# Patient Record
Sex: FEMALE | Race: OTHER | HISPANIC OR LATINO | URBAN - METROPOLITAN AREA
[De-identification: names, ages, dates, MRNs, and addresses within clinical notes are randomized per-mention and may not be internally consistent; named-entity substitution may affect disease eponyms.]

---

## 2020-01-30 ENCOUNTER — EMERGENCY (EMERGENCY)
Facility: HOSPITAL | Age: 25
LOS: 1 days | Discharge: HOME | End: 2020-01-30
Attending: EMERGENCY MEDICINE
Payer: MEDICAID

## 2020-01-30 VITALS
SYSTOLIC BLOOD PRESSURE: 116 MMHG | RESPIRATION RATE: 20 BRPM | TEMPERATURE: 98 F | DIASTOLIC BLOOD PRESSURE: 83 MMHG | HEART RATE: 78 BPM | WEIGHT: 134.92 LBS | HEIGHT: 64 IN | OXYGEN SATURATION: 99 %

## 2020-01-30 DIAGNOSIS — N75.0 CYST OF BARTHOLIN'S GLAND: ICD-10-CM

## 2020-01-30 PROBLEM — Z00.00 ENCOUNTER FOR PREVENTIVE HEALTH EXAMINATION: Status: ACTIVE | Noted: 2020-01-30

## 2020-01-30 LAB
APPEARANCE UR: CLEAR — SIGNIFICANT CHANGE UP
BILIRUB UR-MCNC: NEGATIVE — SIGNIFICANT CHANGE UP
COLOR SPEC: YELLOW — SIGNIFICANT CHANGE UP
DIFF PNL FLD: ABNORMAL
GLUCOSE UR QL: NEGATIVE — SIGNIFICANT CHANGE UP
HCG UR QL: NEGATIVE — SIGNIFICANT CHANGE UP
KETONES UR-MCNC: ABNORMAL
LEUKOCYTE ESTERASE UR-ACNC: ABNORMAL
NITRITE UR-MCNC: NEGATIVE — SIGNIFICANT CHANGE UP
PH UR: 6 — SIGNIFICANT CHANGE UP (ref 5–8)
PROT UR-MCNC: 30 MG/DL
SP GR SPEC: 1.02 — SIGNIFICANT CHANGE UP (ref 1.01–1.02)
UROBILINOGEN FLD QL: 4

## 2020-01-30 PROCEDURE — 99284 EMERGENCY DEPT VISIT MOD MDM: CPT | Mod: 25

## 2020-01-30 PROCEDURE — 81025 URINE PREGNANCY TEST: CPT

## 2020-01-30 PROCEDURE — 81001 URINALYSIS AUTO W/SCOPE: CPT

## 2020-01-30 PROCEDURE — 99283 EMERGENCY DEPT VISIT LOW MDM: CPT

## 2020-01-30 PROCEDURE — 87075 CULTR BACTERIA EXCEPT BLOOD: CPT

## 2020-01-30 PROCEDURE — 87205 SMEAR GRAM STAIN: CPT

## 2020-01-30 PROCEDURE — 87086 URINE CULTURE/COLONY COUNT: CPT

## 2020-01-30 PROCEDURE — 56420 I&D BARTHOLINS GLAND ABSCESS: CPT | Mod: RT

## 2020-01-30 PROCEDURE — 56420 I&D BARTHOLINS GLAND ABSCESS: CPT

## 2020-01-30 PROCEDURE — 87070 CULTURE OTHR SPECIMN AEROBIC: CPT

## 2020-01-30 RX ORDER — LIDOCAINE HYDROCHLORIDE AND EPINEPHRINE 10; 10 MG/ML; UG/ML
10 INJECTION, SOLUTION INFILTRATION; PERINEURAL ONCE
Refills: 0 | Status: COMPLETED | OUTPATIENT
Start: 2020-01-30 | End: 2020-01-30

## 2020-01-30 RX ORDER — ACETAMINOPHEN 500 MG
650 TABLET ORAL ONCE
Refills: 0 | Status: COMPLETED | OUTPATIENT
Start: 2020-01-30 | End: 2020-01-30

## 2020-01-30 RX ADMIN — Medication 650 MILLIGRAM(S): at 10:00

## 2020-01-30 RX ADMIN — Medication 650 MILLIGRAM(S): at 09:06

## 2020-01-30 RX ADMIN — LIDOCAINE HYDROCHLORIDE AND EPINEPHRINE 10 MILLILITER(S): 10; 10 INJECTION, SOLUTION INFILTRATION; PERINEURAL at 11:20

## 2020-01-30 NOTE — CONSULT NOTE ADULT - ASSESSMENT
a/p Right barthmitzys giorgio henry at bedside; informed consent for I&D taken  pts partner at bedside. Area cleaned with betadine, lidocaine with epi 10cc used for pain. Then incision made. +lots of pus  Qtip culture used to get all corners of area. Area cleaned with saline. Word catheter placed, 3cc used.  pt instructed to go home with doxycyline for 10 days  no sex for one month and follow up with gyn 2 weeks   spoke pt in [place].   jorge

## 2020-01-30 NOTE — ED PROVIDER NOTE - NSFOLLOWUPINSTRUCTIONS_ED_ALL_ED_FT
Please follow up with OB/GYN in 10 days.  Please use ibuprofen as needed for pain.  Please take the antibiotics as prescribed.  Please return to the emergency department if you have severe worsening pain, fever, discharge, or any other symptoms.

## 2020-01-30 NOTE — ED PROVIDER NOTE - OBJECTIVE STATEMENT
24yoF previously healthy on no meds presents with painful vaginal cyst x 1 wk, swelling improved with Augmentin x2 days prescribed by PMD but pain worsening. Denies fever, vaginal discharge, abdominal pain, dysuria, or any other symptoms.

## 2020-01-30 NOTE — CONSULT NOTE ADULT - PROBLEM SELECTOR RECOMMENDATION 9
a/p Right barthmitzys giogrio henry at bedside; informed consent for I&D taken  pts partner at bedside. Area cleaned with betadine, lidocaine with epi 10cc used for pain. Then incision made. +lots of pus  Qtip culture used to get all corners of area. Area cleaned with saline. Word catheter placed, 3cc used.  pt instructed to go home with doxycyline for 10 days  no sex for one month and follow up with gyn 2 weeks   spoke pt in [place].   jorge

## 2020-01-30 NOTE — ED PROVIDER NOTE - NSFOLLOWUPCLINICS_GEN_ALL_ED_FT
[facility]  [address]  [address] [place] [address]  Phone: [phone]  Fax: [phone]  Follow Up Time: 1-3 Days

## 2020-01-30 NOTE — CONSULT NOTE ADULT - SUBJECTIVE AND OBJECTIVE BOX
24y.o g0 came c/o Right labial pain for one week, pt was taking augmentin for 2 days. denies fever and chills  ob/gyn never seen   pmhx: denies  pshx: denies  all:nka  meds:denies   sochx: no etoh/drug/tobacco use    REVIEW OF SYSTEMS: see HPI	    PE:  Vital Signs Last 24 Hrs  T(C): 36.9 (2020 08:11), Max: 36.9 (2020 08:11)  T(F): 98.5 (2020 08:11), Max: 98.5 (2020 08:11)  HR: 78 (2020 08:11) (78 - 78)  BP: 116/83 (2020 08:11) (116/83 - 116/83)  BP(mean): --  RR: 20 (2020 08:11) (20 - 20)  SpO2: 99% (2020 08:11) (99% - 99%)  abd: +bs; soft, nt, nd, no rebound or guarding; no cvat b/l  pelvis:  +right barthlins cyst +fluctuating.3cm x4cm.             Urinalysis Basic - ( 2020 08:54 )    Color: Yellow / Appearance: Clear / S.025 / pH: x  Gluc: x / Ketone: Large  / Bili: Negative / Urobili: 4   Blood: x / Protein: 30 mg/dL / Nitrite: Negative   Leuk Esterase: Trace / RBC: 0-2 /HPF / WBC 0-2 /HPF   Sq Epi: x / Non Sq Epi: Few /HPF / Bacteria: x        a/p Right bartholins abcess   at bedside; informed consent for I&D taken  pts partner at bedside. Area cleaned with betadine, lidocaine with epi 10cc used for pain. Then incision made. +lots of pus  Qtip culture used to get all corners of area. Area cleaned with saline. Word catheter placed, 3cc used.  pt instructed to go home with doxycyline for 10 days  no sex for one month and follow up with gyn 2 weeks   spoke pt in [place]gasque.   jorge

## 2020-01-30 NOTE — ED ADULT NURSE NOTE - OBJECTIVE STATEMENT
Pt AOx4, ambulatory, c/o vaginal labia cyst x 10 days with worsening pain and swelling x 2 days. Pt denies vaginal bleed, discharge or dysuria. Currently on antbx.

## 2020-01-30 NOTE — ED PROVIDER NOTE - CLINICAL SUMMARY MEDICAL DECISION MAKING FREE TEXT BOX
Localized Bartholin cyst with no e/o spread or cellulitis. No s/s's to suggest PID or UTI. Localized Bartholin cyst with no e/o spread or cellulitis. No s/s's to suggest PID or UTI. Seen by OB/GYN, SISI Zuniga and attending and I&D performed. Patient is well appearing, NAD, afebrile, hemodynamically stable. Discharged with rx doxycycline per GN request, instructions in further symptomatic care, return precautions, and need for GYN f/u.

## 2020-01-30 NOTE — ED PROVIDER NOTE - PHYSICAL EXAMINATION
Afebrile, hemodynamically stable, saturating well  NAD, well appearing  Head NCAT  EOMI grossly, anicteric  MMM  Abd soft, NT, ND, no rebound or guarding   (RN Aleksandra as chaperone): enlarged tender Bartholin cyst, no surrounding erythema  AAO, CN's 3-12 grossly intact  ALVARES spontaneously, no leg cyanosis or edema  Skin warm, well perfused, no rashes or hives

## 2020-01-30 NOTE — ED PROVIDER NOTE - PATIENT PORTAL LINK FT
You can access the FollowMyHealth Patient Portal offered by [facility] by registering at the following website: [email or web address]. By joining [facility]’s FollowMyHealth portal, you will also be able to view your health information using other applications (apps) compatible with our system.

## 2020-01-30 NOTE — ED ADULT NURSE NOTE - NSIMPLEMENTINTERV_GEN_ALL_ED
Implemented All Universal Safety Interventions:  [place] to call system. Call bell, personal items and telephone within reach. Instruct patient to call for assistance. Room bathroom lighting operational. Non-slip footwear when patient is off stretcher. Physically safe environment: no spills, clutter or unnecessary equipment. Stretcher in lowest position, wheels locked, appropriate side rails in place.

## 2020-01-31 LAB
CULTURE RESULTS: NO GROWTH — SIGNIFICANT CHANGE UP
SPECIMEN SOURCE: SIGNIFICANT CHANGE UP

## 2020-02-04 ENCOUNTER — APPOINTMENT (OUTPATIENT)
Dept: OBGYN | Facility: CLINIC | Age: 25
End: 2020-02-04

## 2021-05-03 NOTE — CONSULT NOTE ADULT - PROVIDER SPECIALTY LIST ADULT
GYN
Detail Level: Generalized
General Sunscreen Counseling: I recommended a broad spectrum sunscreen with a SPF of 30 or higher.  I explained that SPF 30 sunscreens block approximately 97 percent of the sun's harmful rays.  Sunscreens should be applied at least 15 minutes prior to expected sun exposure and then every 2 hours after that as long as sun exposure continues. If swimming or exercising sunscreen should be reapplied every 45 minutes to an hour after getting wet or sweating.  One ounce, or the equivalent of a shot glass full of sunscreen, is adequate to protect the skin not covered by a bathing suit. I also recommended a lip balm with a sunscreen as well. Sun protective clothing can be used in lieu of sunscreen but must be worn the entire time you are exposed to the sun's rays.

## 2022-08-29 ENCOUNTER — APPOINTMENT (OUTPATIENT)
Dept: OBGYN | Facility: CLINIC | Age: 27
End: 2022-08-29